# Patient Record
Sex: FEMALE | Race: WHITE | NOT HISPANIC OR LATINO | Employment: OTHER | URBAN - NONMETROPOLITAN AREA
[De-identification: names, ages, dates, MRNs, and addresses within clinical notes are randomized per-mention and may not be internally consistent; named-entity substitution may affect disease eponyms.]

---

## 2021-02-09 ENCOUNTER — CLINICAL SUPPORT (OUTPATIENT)
Dept: CARDIOLOGY | Facility: CLINIC | Age: 81
End: 2021-02-09

## 2021-02-09 DIAGNOSIS — I48.0 PAROXYSMAL ATRIAL FIBRILLATION (HCC): Primary | ICD-10-CM

## 2021-02-09 LAB — INR PPP: 2.4 (ref 0.9–1.1)

## 2021-02-09 PROCEDURE — 36416 COLLJ CAPILLARY BLOOD SPEC: CPT | Performed by: INTERNAL MEDICINE

## 2021-02-09 PROCEDURE — 85610 PROTHROMBIN TIME: CPT | Performed by: INTERNAL MEDICINE

## 2021-02-09 NOTE — PROGRESS NOTES
pts range is 2-3 INR today was 2.4 pt reports taking 4mg of warfarin everyday. No dose changes-told to repeat in 1 month.

## 2021-03-17 ENCOUNTER — OFFICE VISIT (OUTPATIENT)
Dept: CARDIOLOGY | Facility: CLINIC | Age: 81
End: 2021-03-17

## 2021-03-17 VITALS
OXYGEN SATURATION: 98 % | HEART RATE: 74 BPM | RESPIRATION RATE: 18 BRPM | SYSTOLIC BLOOD PRESSURE: 132 MMHG | DIASTOLIC BLOOD PRESSURE: 90 MMHG | TEMPERATURE: 98.7 F | HEIGHT: 60 IN | BODY MASS INDEX: 36.32 KG/M2 | WEIGHT: 185 LBS

## 2021-03-17 DIAGNOSIS — I10 ESSENTIAL HYPERTENSION: ICD-10-CM

## 2021-03-17 DIAGNOSIS — I48.0 PAF (PAROXYSMAL ATRIAL FIBRILLATION) (HCC): Primary | ICD-10-CM

## 2021-03-17 LAB — INR PPP: 2.2 (ref 0.9–1.1)

## 2021-03-17 PROCEDURE — 93000 ELECTROCARDIOGRAM COMPLETE: CPT | Performed by: INTERNAL MEDICINE

## 2021-03-17 PROCEDURE — 99214 OFFICE O/P EST MOD 30 MIN: CPT | Performed by: INTERNAL MEDICINE

## 2021-03-17 PROCEDURE — 36416 COLLJ CAPILLARY BLOOD SPEC: CPT | Performed by: INTERNAL MEDICINE

## 2021-03-17 PROCEDURE — 85610 PROTHROMBIN TIME: CPT | Performed by: INTERNAL MEDICINE

## 2021-03-17 RX ORDER — ALPRAZOLAM 0.5 MG/1
0.5 TABLET ORAL DAILY
COMMUNITY

## 2021-03-17 NOTE — PROGRESS NOTES
[facility]  [facility] CARDIOLOGY  [address] [place] [address]  Dept: [phone]  Dept Fax: [phone]    Zenaida Bond  1940    Follow Up Office Visit Note    History of Present Illness:  Zenaida Bond is a 80 y.o. female who presents to the clinic for Follow-up.PAF- She feels weak and tired, lost her  2 months ago, but also she is on AF HR 80, will increase the Sotalol to 80 mg bid, she wants to take Eliquis 5mg bid, will do it INR is 2,2     The following portions of the patient's history were reviewed and updated as appropriate: allergies, current medications, past family history, past medical history, past social history, past surgical history and problem list.    Medications:  ALPRAZolam  candesartan  NIFEdipine XL  sotalol  warfarin    Subjective  Allergies   Allergen Reactions   • Cefdinir Unknown - High Severity   • Gabapentin Unknown - High Severity   • Macrobid [Nitrofurantoin] GI Intolerance        Past Medical History:   Diagnosis Date   • BMI 34.0-34.9,adult    • Essential (primary) hypertension    • Paroxysmal atrial fibrillation (CMS/HCC)    • Uterine cancer (CMS/HCC)        History reviewed. No pertinent surgical history.    Family History   Problem Relation Age of Onset   • Breast cancer Mother    • Lung cancer Father    • Heart failure Sister    • Cirrhosis Brother         Social History     Socioeconomic History   • Marital status:      Spouse name: Not on file   • Number of children: Not on file   • Years of education: Not on file   • Highest education level: Not on file   Tobacco Use   • Smoking status: Never Smoker   • Smokeless tobacco: Never Used   Substance and Sexual Activity   • Alcohol use: Not Currently   • Drug use: Never   • Sexual activity: Defer       Review of Systems   Constitutional: Positive for activity change and fatigue.   Respiratory: Positive for shortness of breath.        Cardiovascular Procedures    ECHO/MUGA:  "  STRESS TESTS:   CARDIAC CATH:   DEVICES:   HOLTER:   CT/MRI:   VASCULAR:   CARDIOTHORACIC:     Objective  Vitals:    03/17/21 1112   BP: 132/90   BP Location: Left arm   Patient Position: Lying   Cuff Size: Large Adult   Pulse: 74   Resp: 18   Temp: 98.7 °F (37.1 °C)   TempSrc: Infrared   SpO2: 98%   Weight: 83.9 kg (185 lb)   Height: 152.4 cm (60\")   PainSc: 0-No pain     Body mass index is 36.13 kg/m².     Physical Exam  Constitutional:       Appearance: Healthy appearance. Not in distress.   Neck:      Vascular: No JVR. JVD normal.   Pulmonary:      Effort: Pulmonary effort is normal.      Breath sounds: Normal breath sounds. No wheezing. No rhonchi. No rales.   Chest:      Chest wall: Not tender to palpatation.   Cardiovascular:      PMI at left midclavicular line. Normal rate. Irregular rhythm. Normal S1. Normal S2.      Murmurs: There is no murmur.      No gallop. No click. No rub.   Pulses:     Intact distal pulses.   Edema:     Peripheral edema absent.   Abdominal:      General: Bowel sounds are normal.      Palpations: Abdomen is soft.      Tenderness: There is no abdominal tenderness.   Musculoskeletal: Normal range of motion.         General: No tenderness. Skin:     General: Skin is warm and dry.   Neurological:      General: No focal deficit present.      Mental Status: Alert and oriented to person, place and time.          Diagnostic Data    ECG 12 Lead    Date/Time: 3/17/2021 11:49 AM  Performed by: Rajan Hernandez MD  Authorized by: Rajan Hernandez MD   Comparison: compared with previous ECG   Rhythm: atrial fibrillation  Rate: normal  BPM: 80  QRS axis: left    Clinical impression: abnormal EKG            Assessment and Plan  Diagnoses and all orders for this visit:    PAF (paroxysmal atrial fibrillation) (CMS/HCC)- She feels tired and fatigue, HR is 80, will increase Sotalol to 80 mg bid if not conversion, will do cardioversion, she wants to change to Eliquis 5 mg bid creatinine is " normal    Essential hypertension- The BP is 120.80 on Candesartan 32 mg and Nifedipine xl 60 mg     Other orders  -     ALPRAZolam (XANAX) 0.5 MG tablet; Take 0.5 mg by mouth Daily. Take one tablet by mouth at bedtime PRN         No follow-ups on file.    Rajan Hernandez MD  03/17/2021

## 2021-03-25 ENCOUNTER — TELEPHONE (OUTPATIENT)
Dept: CARDIOLOGY | Facility: CLINIC | Age: 81
End: 2021-03-25

## 2021-03-26 ENCOUNTER — TELEPHONE (OUTPATIENT)
Dept: CARDIOLOGY | Facility: CLINIC | Age: 81
End: 2021-03-26

## 2021-03-26 NOTE — TELEPHONE ENCOUNTER
Pt came in today for a  B/p check and pt brought list with last week number it is in your black folder. Please advise. 130/70

## 2021-03-29 ENCOUNTER — OFFICE VISIT (OUTPATIENT)
Dept: CARDIOLOGY | Facility: CLINIC | Age: 81
End: 2021-03-29

## 2021-03-29 VITALS
DIASTOLIC BLOOD PRESSURE: 82 MMHG | WEIGHT: 175 LBS | SYSTOLIC BLOOD PRESSURE: 136 MMHG | BODY MASS INDEX: 34.36 KG/M2 | OXYGEN SATURATION: 98 % | RESPIRATION RATE: 12 BRPM | HEIGHT: 60 IN | HEART RATE: 57 BPM | TEMPERATURE: 97.1 F

## 2021-03-29 DIAGNOSIS — I48.0 PAF (PAROXYSMAL ATRIAL FIBRILLATION) (HCC): Primary | ICD-10-CM

## 2021-03-29 DIAGNOSIS — I10 ESSENTIAL HYPERTENSION: ICD-10-CM

## 2021-03-29 PROCEDURE — 99214 OFFICE O/P EST MOD 30 MIN: CPT | Performed by: INTERNAL MEDICINE

## 2021-03-29 PROCEDURE — 93000 ELECTROCARDIOGRAM COMPLETE: CPT | Performed by: INTERNAL MEDICINE

## 2021-03-29 RX ORDER — SOTALOL HYDROCHLORIDE 80 MG/1
TABLET ORAL
Qty: 60 TABLET | Refills: 6 | Status: SHIPPED | OUTPATIENT
Start: 2021-03-29 | End: 2021-08-18 | Stop reason: SDUPTHER

## 2021-03-29 NOTE — PROGRESS NOTES
[facility]  [facility] CARDIOLOGY  [address] [place] [address]  Dept: [phone]  Dept Fax: [phone]    Zenaida Bond  1940    Follow Up Office Visit Note    History of Present Illness:  Zenaida Bond is a 80 y.o. female who presents to the clinic for PAF-She was feeling fatigue and tired and her HR was slower, she lower the sotalol to 40 mg bid, she is back to sinus, HR is 60, will use Sotalol 80 AM and 40 PM, will keep Eliquis,    The following portions of the patient's history were reviewed and updated as appropriate: allergies, current medications, past family history, past medical history, past social history, past surgical history and problem list.    Medications:  ALPRAZolam  apixaban tablet  candesartan  NIFEdipine XL  sotalol    Subjective  Allergies   Allergen Reactions   • Cefdinir Unknown - High Severity   • Gabapentin Unknown - High Severity   • Macrobid [Nitrofurantoin] GI Intolerance        Past Medical History:   Diagnosis Date   • BMI 34.0-34.9,adult    • Essential (primary) hypertension    • Paroxysmal atrial fibrillation (CMS/HCC)    • Uterine cancer (CMS/HCC)        History reviewed. No pertinent surgical history.    Family History   Problem Relation Age of Onset   • Breast cancer Mother    • Lung cancer Father    • Heart failure Sister    • Cirrhosis Brother         Social History     Socioeconomic History   • Marital status:      Spouse name: Not on file   • Number of children: Not on file   • Years of education: Not on file   • Highest education level: Not on file   Tobacco Use   • Smoking status: Never Smoker   • Smokeless tobacco: Never Used   Substance and Sexual Activity   • Alcohol use: Not Currently   • Drug use: Never   • Sexual activity: Defer       Review of Systems   Constitutional: Negative.    HENT: Negative.    Respiratory: Negative.    Cardiovascular: Negative.    Endocrine: Negative.    Genitourinary: Negative.   "  Musculoskeletal: Negative.    Skin: Negative.    Allergic/Immunologic: Negative.    Neurological: Negative.    Hematological: Negative.    Psychiatric/Behavioral: Negative.    All other systems reviewed and are negative.      Cardiovascular Procedures    ECHO/MUGA:   STRESS TESTS:   CARDIAC CATH:   DEVICES:   HOLTER:   CT/MRI:   VASCULAR:   CARDIOTHORACIC:     Objective  Vitals:    03/29/21 1107   BP: 136/82   BP Location: Left arm   Patient Position: Sitting   Cuff Size: Adult   Pulse: 57   Resp: 12   Temp: 97.1 °F (36.2 °C)   TempSrc: Infrared   SpO2: 98%   Weight: 79.4 kg (175 lb)   Height: 152.4 cm (60\")   PainSc: 0-No pain     Body mass index is 34.18 kg/m².     Physical Exam  Cardiovascular:      PMI at left midclavicular line. Normal rate. Regular rhythm. Normal S1. Normal S2.      Murmurs: There is no murmur.      No gallop. No click. No rub.   Pulses:     Intact distal pulses.   Edema:     Peripheral edema absent.          Diagnostic Data    ECG 12 Lead    Date/Time: 3/29/2021 11:53 AM  Performed by: Rajan Hernandez MD  Authorized by: Rajan Hernandez MD   Comparison: compared with previous ECG   Similar to previous ECG  Rhythm: sinus rhythm  Rate: normal  BPM: 60  QRS axis: normal    Clinical impression: normal ECG            Assessment and Plan  Diagnoses and all orders for this visit:    PAF (paroxysmal atrial fibrillation) (CMS/[facility])- She seems doing better, went back to sinus, will increase Sotalol to 80 and 40 Pm     Essential hypertension- BP is fine 120.80 Dr Abbasi has increase Nifedipine to 60 mg bid, keep also candesartan 32 mg    Other orders  -     sotalol (BETAPACE) 80 MG tablet; Take one pill in AM  And half of the pill PM         Return in about 2 months (around 5/29/2021) for Recheck.    Rajan Hernandez MD  03/29/2021  "

## 2021-04-12 ENCOUNTER — TELEPHONE (OUTPATIENT)
Dept: CARDIOLOGY | Facility: CLINIC | Age: 81
End: 2021-04-12

## 2021-04-12 ENCOUNTER — OUTSIDE FACILITY SERVICE (OUTPATIENT)
Dept: CARDIOLOGY | Facility: CLINIC | Age: 81
End: 2021-04-12

## 2021-04-12 PROCEDURE — 93306 TTE W/DOPPLER COMPLETE: CPT | Performed by: INTERNAL MEDICINE

## 2021-04-12 NOTE — TELEPHONE ENCOUNTER
ALLEY CALLING STATING SINCE SHE STARTED ON ELIQUIS SHE HAS A LOW HEART BEAT AND NO ENERGY AND HER HEAD FEELS NUMB     PLEASE CALL HER    [phone]

## 2021-04-13 ENCOUNTER — OUTSIDE FACILITY SERVICE (OUTPATIENT)
Dept: CARDIOLOGY | Facility: CLINIC | Age: 81
End: 2021-04-13

## 2021-04-13 PROCEDURE — 99232 SBSQ HOSP IP/OBS MODERATE 35: CPT | Performed by: INTERNAL MEDICINE

## 2021-04-14 ENCOUNTER — TELEPHONE (OUTPATIENT)
Dept: CARDIOLOGY | Facility: CLINIC | Age: 81
End: 2021-04-14

## 2021-04-14 NOTE — TELEPHONE ENCOUNTER
Keep an eye  
Please advise the message below..    patient just got out of hosp she is taking the hydrochloride and then she took her BP it was     101/47     Heart rate 52     Please advise  She feels weak        [phone]  
Pt is going to keep track of her b/p and call us on Friday if not better.  
patient just got out of hosp she is taking the hydrochloride and then she took her BP it was    101/47    Heart rate 52    Please advise  She feels weak      [phone]  
36.7

## 2021-04-19 ENCOUNTER — OFFICE VISIT (OUTPATIENT)
Dept: CARDIOLOGY | Facility: CLINIC | Age: 81
End: 2021-04-19

## 2021-04-19 VITALS
BODY MASS INDEX: 33.89 KG/M2 | OXYGEN SATURATION: 99 % | DIASTOLIC BLOOD PRESSURE: 72 MMHG | HEIGHT: 60 IN | SYSTOLIC BLOOD PRESSURE: 130 MMHG | WEIGHT: 172.6 LBS | RESPIRATION RATE: 12 BRPM | TEMPERATURE: 97.8 F | HEART RATE: 88 BPM

## 2021-04-19 DIAGNOSIS — E78.5 HYPERLIPIDEMIA LDL GOAL <100: ICD-10-CM

## 2021-04-19 DIAGNOSIS — I10 ESSENTIAL HYPERTENSION: ICD-10-CM

## 2021-04-19 DIAGNOSIS — I48.0 PAF (PAROXYSMAL ATRIAL FIBRILLATION) (HCC): Primary | ICD-10-CM

## 2021-04-19 PROCEDURE — 99214 OFFICE O/P EST MOD 30 MIN: CPT | Performed by: INTERNAL MEDICINE

## 2021-04-19 RX ORDER — HYDRALAZINE HYDROCHLORIDE 50 MG/1
25 TABLET, FILM COATED ORAL 2 TIMES DAILY
Qty: 180 TABLET | Refills: 3 | Status: SHIPPED | OUTPATIENT
Start: 2021-04-19 | End: 2021-08-18

## 2021-04-19 RX ORDER — HYDRALAZINE HYDROCHLORIDE 50 MG/1
TABLET, FILM COATED ORAL
COMMUNITY
Start: 2021-04-13 | End: 2021-04-19 | Stop reason: SDUPTHER

## 2021-04-19 RX ORDER — ATORVASTATIN CALCIUM 40 MG/1
TABLET, FILM COATED ORAL
COMMUNITY
Start: 2021-04-13

## 2021-04-19 NOTE — PROGRESS NOTES
[facility]  [facility] CARDIOLOGY  [address] [place] [address]  Dept: [phone]  Dept Fax: [phone]    Zenaida Bond  1940    Follow Up Office Visit Note    History of Present Illness:  Zenaida Bond is a 80 y.o. female who presents to the clinic for Follow-up. PAF- She was recently at the hospital with panic attacks, she has been in sinus on Sotalol 80.40, he just mention that she is not taking the night dose, advised to do it, her HR is 80, also on Eliquis 5mg bid    The following portions of the patient's history were reviewed and updated as appropriate: allergies, current medications, past family history, past medical history, past social history, past surgical history and problem list.    Medications:  ALPRAZolam  apixaban tablet  atorvastatin  candesartan  hydrALAZINE  NIFEdipine XL  sotalol    Subjective  Allergies   Allergen Reactions   • Cefdinir Unknown - High Severity   • Gabapentin Unknown - High Severity   • Macrobid [Nitrofurantoin] GI Intolerance        Past Medical History:   Diagnosis Date   • BMI 34.0-34.9,adult    • Essential (primary) hypertension    • Paroxysmal atrial fibrillation (CMS/HCC)    • Uterine cancer (CMS/HCC)        History reviewed. No pertinent surgical history.    Family History   Problem Relation Age of Onset   • Breast cancer Mother    • Lung cancer Father    • Heart failure Sister    • Cirrhosis Brother         Social History     Socioeconomic History   • Marital status:      Spouse name: Not on file   • Number of children: Not on file   • Years of education: Not on file   • Highest education level: Not on file   Tobacco Use   • Smoking status: Never Smoker   • Smokeless tobacco: Never Used   Substance and Sexual Activity   • Alcohol use: Not Currently   • Drug use: Never   • Sexual activity: Defer       Review of Systems   All other systems reviewed and are negative.      Cardiovascular Procedures    ECHO/MUGA:   STRESS  "TESTS:   CARDIAC CATH:   DEVICES:   HOLTER:   CT/MRI:   VASCULAR:   CARDIOTHORACIC:     Objective  Vitals:    04/19/21 1056   BP: 130/72   BP Location: Left arm   Patient Position: Sitting   Cuff Size: Adult   Pulse: 88   Resp: 12   Temp: 97.8 °F (36.6 °C)   TempSrc: Infrared   SpO2: 99%   Weight: 78.3 kg (172 lb 9.6 oz)   Height: 152.4 cm (60\")   PainSc: 0-No pain     Body mass index is 33.71 kg/m².     Physical Exam  Constitutional:       Appearance: Healthy appearance. Not in distress.   Neck:      Vascular: No JVR. JVD normal.   Pulmonary:      Effort: Pulmonary effort is normal.      Breath sounds: Normal breath sounds. No wheezing. No rhonchi. No rales.   Chest:      Chest wall: Not tender to palpatation.   Cardiovascular:      PMI at left midclavicular line. Normal rate. Regular rhythm. Normal S1. Normal S2.      Murmurs: There is no murmur.      No gallop. No click. No rub.   Pulses:     Intact distal pulses.   Edema:     Peripheral edema absent.   Abdominal:      General: Bowel sounds are normal.      Palpations: Abdomen is soft.      Tenderness: There is no abdominal tenderness.   Musculoskeletal: Normal range of motion.         General: No tenderness. Skin:     General: Skin is warm and dry.   Neurological:      General: No focal deficit present.      Mental Status: Alert and oriented to person, place and time.          Diagnostic Data  Procedures    Assessment and Plan  Diagnoses and all orders for this visit:    PAF (paroxysmal atrial fibrillation) (CMS/[facility])- She is in sinus on Sotalol, advised to take twice a day, keep eliquis, no complaints    Essential hypertension- BP is better, on Hydralazine 25 mg bid, nifedipine 60 mg and Candesartan 32mg    Hyperlipidemia LDL goal <100- on Lipitor     Other orders  -     atorvastatin (LIPITOR) 40 MG tablet  -     Discontinue: hydrALAZINE (APRESOLINE) 50 MG tablet  -     hydrALAZINE (APRESOLINE) 50 MG tablet; Take 0.5 tablets by mouth 2 (Two) Times a Day.     "     Return in about 4 months (around 8/19/2021).    Rajan Hernandez MD  04/19/2021

## 2021-08-18 ENCOUNTER — OFFICE VISIT (OUTPATIENT)
Dept: CARDIOLOGY | Facility: CLINIC | Age: 81
End: 2021-08-18

## 2021-08-18 VITALS
RESPIRATION RATE: 12 BRPM | SYSTOLIC BLOOD PRESSURE: 152 MMHG | HEIGHT: 60 IN | TEMPERATURE: 96 F | OXYGEN SATURATION: 99 % | DIASTOLIC BLOOD PRESSURE: 82 MMHG | WEIGHT: 187 LBS | BODY MASS INDEX: 36.71 KG/M2 | HEART RATE: 86 BPM

## 2021-08-18 DIAGNOSIS — I10 ESSENTIAL HYPERTENSION: ICD-10-CM

## 2021-08-18 DIAGNOSIS — E78.5 HYPERLIPIDEMIA LDL GOAL <100: ICD-10-CM

## 2021-08-18 DIAGNOSIS — I48.0 PAF (PAROXYSMAL ATRIAL FIBRILLATION) (HCC): Primary | ICD-10-CM

## 2021-08-18 PROCEDURE — 93000 ELECTROCARDIOGRAM COMPLETE: CPT | Performed by: INTERNAL MEDICINE

## 2021-08-18 PROCEDURE — 99214 OFFICE O/P EST MOD 30 MIN: CPT | Performed by: INTERNAL MEDICINE

## 2021-08-18 RX ORDER — SOTALOL HYDROCHLORIDE 80 MG/1
TABLET ORAL
Qty: 60 TABLET | Refills: 6 | Status: SHIPPED | OUTPATIENT
Start: 2021-08-18 | End: 2021-10-01 | Stop reason: SDUPTHER

## 2021-08-18 NOTE — PROGRESS NOTES
[facility]  [facility] CARDIOLOGY  [address] [place] [address]  Dept: [phone]  Dept Fax: [phone]    Zenaida Bond  1940    Follow Up Office Visit Note    History of Present Illness:  Zenaida Bond is a 81 y.o. female who presents to the clinic for Follow-up. Paroxysmal atrial Fibrillation- She is asymptomatic but back to AF/,Hr is 104, she has taking the sotalol 80 daily instead of twice a day/.She thinks her HR was too low? Will increase back to 80 mg bid, if does not come back to sinus, will do cardioversion, or rate control    The following portions of the patient's history were reviewed and updated as appropriate: allergies, current medications, past family history, past medical history, past social history, past surgical history and problem list.    Medications:  ALPRAZolam  apixaban tablet  atorvastatin  candesartan  hydrALAZINE  NIFEdipine XL  sotalol    Subjective  Allergies   Allergen Reactions   • Cefdinir Unknown - High Severity   • Gabapentin Unknown - High Severity   • Macrobid [Nitrofurantoin] GI Intolerance        Past Medical History:   Diagnosis Date   • BMI 34.0-34.9,adult    • Essential (primary) hypertension    • Paroxysmal atrial fibrillation (CMS/HCC)    • Uterine cancer (CMS/HCC)        History reviewed. No pertinent surgical history.    Family History   Problem Relation Age of Onset   • Breast cancer Mother    • Lung cancer Father    • Heart failure Sister    • Cirrhosis Brother         Social History     Socioeconomic History   • Marital status:      Spouse name: Not on file   • Number of children: Not on file   • Years of education: Not on file   • Highest education level: Not on file   Tobacco Use   • Smoking status: Never Smoker   • Smokeless tobacco: Never Used   Vaping Use   • Vaping Use: Never used   Substance and Sexual Activity   • Alcohol use: Not Currently   • Drug use: Never   • Sexual activity: Defer       Review of  "Systems   Constitutional: Negative.    HENT: Negative.    Respiratory: Negative.    Cardiovascular: Negative.    Endocrine: Negative.    Genitourinary: Negative.    Musculoskeletal: Negative.    Skin: Negative.    Allergic/Immunologic: Negative.    Neurological: Negative.    Hematological: Negative.    Psychiatric/Behavioral: Negative.    All other systems reviewed and are negative.      Cardiovascular Procedures    ECHO/MUGA:   STRESS TESTS:   CARDIAC CATH:   DEVICES:   HOLTER:   CT/MRI:   VASCULAR:   CARDIOTHORACIC:     Objective  Vitals:    08/18/21 1321   BP: 152/82   BP Location: Left arm   Patient Position: Lying   Cuff Size: Adult   Pulse: 86   Resp: 12   Temp: 96 °F (35.6 °C)   TempSrc: Infrared   SpO2: 99%   Weight: 84.8 kg (187 lb)   Height: 152.4 cm (60\")   PainSc: 0-No pain     Body mass index is 36.52 kg/m².     Physical Exam  Constitutional:       Appearance: Healthy appearance. Not in distress.   Neck:      Vascular: No JVR. JVD normal.   Pulmonary:      Effort: Pulmonary effort is normal.      Breath sounds: Normal breath sounds. No wheezing. No rhonchi. No rales.   Chest:      Chest wall: Not tender to palpatation.   Cardiovascular:      Irregularly irregular rhythm.   Abdominal:      General: Bowel sounds are normal.      Palpations: Abdomen is soft.      Tenderness: There is no abdominal tenderness.   Musculoskeletal: Normal range of motion.         General: No tenderness. Skin:     General: Skin is warm and dry.   Neurological:      General: No focal deficit present.      Mental Status: Alert and oriented to person, place and time.          Diagnostic Data    ECG 12 Lead    Date/Time: 8/18/2021 1:56 PM  Performed by: Rajan Hernandez MD  Authorized by: Rajan Hernandez MD   Comparison: not compared with previous ECG   Rhythm: atrial fibrillation  Rate: tachycardic  BPM: 104  QRS axis: left    Clinical impression: abnormal EKG            Assessment and Plan  Diagnoses and all orders " for this visit:    PAF (paroxysmal atrial fibrillation) (CMS/HCC)- She is again in AF due to non compliance , will restart 80 mg bid, and Eliquis 5mg bid    Essential hypertension-The BP is 150.80 she stop taking the Sotalol, she states at home Bp is low ???? On Candesartan 32mg, Nifedipine ER  60 mg    Hyperlipidemia LDL goal <100- On Lipitor 40 mg         No follow-ups on file.    Rajan Hernandez MD  08/18/2021

## 2021-09-15 ENCOUNTER — OFFICE VISIT (OUTPATIENT)
Dept: CARDIOLOGY | Facility: CLINIC | Age: 81
End: 2021-09-15

## 2021-09-15 VITALS
SYSTOLIC BLOOD PRESSURE: 110 MMHG | HEIGHT: 60 IN | DIASTOLIC BLOOD PRESSURE: 68 MMHG | WEIGHT: 181 LBS | HEART RATE: 49 BPM | TEMPERATURE: 97.1 F | OXYGEN SATURATION: 96 % | RESPIRATION RATE: 15 BRPM | BODY MASS INDEX: 35.53 KG/M2

## 2021-09-15 DIAGNOSIS — I48.0 PAF (PAROXYSMAL ATRIAL FIBRILLATION) (HCC): Primary | ICD-10-CM

## 2021-09-15 DIAGNOSIS — I10 ESSENTIAL HYPERTENSION: ICD-10-CM

## 2021-09-15 DIAGNOSIS — E78.5 HYPERLIPIDEMIA LDL GOAL <100: ICD-10-CM

## 2021-09-15 PROCEDURE — 99214 OFFICE O/P EST MOD 30 MIN: CPT | Performed by: INTERNAL MEDICINE

## 2021-09-15 PROCEDURE — 93000 ELECTROCARDIOGRAM COMPLETE: CPT | Performed by: INTERNAL MEDICINE

## 2021-09-15 NOTE — PROGRESS NOTES
[facility] [place]  [facility] CARDIOLOGY  [address] [place] [address]  Dept: [phone]  Dept Fax: [phone]    Zenaida Bond  1940    Follow Up Office Visit Note    History of Present Illness:  Zenaida Bond is a 81 y.o. female who presents to the clinic for Follow-up. She is asympotomatic but still on AF on Sotalol 80 mg bid, and also Eliquis 5 mg bid, she has not decided to go for cardioversion, she will discuss with her children.    The following portions of the patient's history were reviewed and updated as appropriate: allergies, current medications, past family history, past medical history, past social history, past surgical history and problem list.    Medications:  ALPRAZolam  apixaban tablet  atorvastatin  candesartan  NIFEdipine XL  sotalol    Subjective  Allergies   Allergen Reactions   • Cefdinir Unknown - High Severity   • Gabapentin Unknown - High Severity   • Macrobid [Nitrofurantoin] GI Intolerance        Past Medical History:   Diagnosis Date   • BMI 34.0-34.9,adult    • Essential (primary) hypertension    • Paroxysmal atrial fibrillation (CMS/HCC)    • Uterine cancer (CMS/HCC)        History reviewed. No pertinent surgical history.    Family History   Problem Relation Age of Onset   • Breast cancer Mother    • Lung cancer Father    • Heart failure Sister    • Cirrhosis Brother         Social History     Socioeconomic History   • Marital status:      Spouse name: Not on file   • Number of children: Not on file   • Years of education: Not on file   • Highest education level: Not on file   Tobacco Use   • Smoking status: Never Smoker   • Smokeless tobacco: Never Used   Vaping Use   • Vaping Use: Never used   Substance and Sexual Activity   • Alcohol use: Not Currently   • Drug use: Never   • Sexual activity: Defer       Review of Systems   Constitutional: Negative.    HENT: Negative.    Respiratory: Negative.    Cardiovascular: Negative.   "  Endocrine: Negative.    Genitourinary: Negative.    Musculoskeletal: Negative.    Skin: Negative.    Allergic/Immunologic: Negative.    Neurological: Negative.    Hematological: Negative.    Psychiatric/Behavioral: Negative.    All other systems reviewed and are negative.      Cardiovascular Procedures    ECHO/MUGA:   STRESS TESTS:   CARDIAC CATH:   DEVICES:   HOLTER:   CT/MRI:   VASCULAR:   CARDIOTHORACIC:     Objective  Vitals:    09/15/21 1419   BP: 110/68   BP Location: Left arm   Patient Position: Sitting   Cuff Size: Large Adult   Pulse: (!) 49   Resp: 15   Temp: 97.1 °F (36.2 °C)   TempSrc: Infrared   SpO2: 96%   Weight: 82.1 kg (181 lb)   Height: 152.4 cm (60\")   PainSc: 0-No pain     Body mass index is 35.35 kg/m².     Physical Exam  Constitutional:       Appearance: Healthy appearance. Not in distress.   Neck:      Vascular: No JVR. JVD normal.   Pulmonary:      Effort: Pulmonary effort is normal.      Breath sounds: Normal breath sounds. No wheezing. No rhonchi. No rales.   Chest:      Chest wall: Not tender to palpatation.   Cardiovascular:      PMI at left midclavicular line. Normal rate. Regular rhythm. Normal S1. Normal S2.      Murmurs: There is no murmur.      No gallop. No click. No rub.   Pulses:     Intact distal pulses.   Edema:     Peripheral edema absent.   Abdominal:      General: Bowel sounds are normal.      Palpations: Abdomen is soft.      Tenderness: There is no abdominal tenderness.   Musculoskeletal: Normal range of motion.         General: No tenderness. Skin:     General: Skin is warm and dry.   Neurological:      General: No focal deficit present.      Mental Status: Alert and oriented to person, place and time.          Diagnostic Data    ECG 12 Lead    Date/Time: 9/15/2021 2:45 PM  Performed by: Rajan Hernandez MD  Authorized by: Rajan Hernandez MD   Comparison: compared with previous ECG   Similar to previous ECG  Rhythm: atrial fibrillation  Rate: normal  BPM: " 66  QRS axis: normal    Clinical impression: abnormal EKG            Assessment and Plan  Diagnoses and all orders for this visit:    PAF (paroxysmal atrial fibrillation) (CMS/[facility])- In AF asymptomatic, cardioversion vs rate control, she will talk to her children    Essential hypertension- BP is 110.60, on Candesartan 32 mgm, Nifedipine xl 60 mg    Hyperlipidemia LDL goal <100 on Lipitor 40 mg         Return in about 3 months (around 12/15/2021) for Recheck.    Rajan Hernandez MD  09/15/2021

## 2021-09-21 ENCOUNTER — TELEPHONE (OUTPATIENT)
Dept: CARDIOLOGY | Facility: CLINIC | Age: 81
End: 2021-09-21

## 2021-09-21 DIAGNOSIS — I10 ESSENTIAL HYPERTENSION: Primary | ICD-10-CM

## 2021-09-21 RX ORDER — CANDESARTAN 32 MG/1
32 TABLET ORAL DAILY
Qty: 90 TABLET | Refills: 3 | Status: SHIPPED | OUTPATIENT
Start: 2021-09-21

## 2021-09-21 NOTE — TELEPHONE ENCOUNTER
candesartan (ATACAND) 32 MG         Patient called requesting a refill of the above medication.  She stated that she only had 2 pills left and that she would like to pick this up when she goes to town tomorrow.  [facility] pharmacy in [place]

## 2021-10-01 ENCOUNTER — OFFICE VISIT (OUTPATIENT)
Dept: CARDIOLOGY | Facility: CLINIC | Age: 81
End: 2021-10-01

## 2021-10-01 VITALS
HEIGHT: 60 IN | TEMPERATURE: 99 F | BODY MASS INDEX: 36.52 KG/M2 | OXYGEN SATURATION: 99 % | HEART RATE: 86 BPM | WEIGHT: 186 LBS | DIASTOLIC BLOOD PRESSURE: 74 MMHG | RESPIRATION RATE: 12 BRPM | SYSTOLIC BLOOD PRESSURE: 146 MMHG

## 2021-10-01 DIAGNOSIS — E78.5 HYPERLIPIDEMIA LDL GOAL <100: ICD-10-CM

## 2021-10-01 DIAGNOSIS — I10 ESSENTIAL HYPERTENSION: ICD-10-CM

## 2021-10-01 DIAGNOSIS — I48.0 PAF (PAROXYSMAL ATRIAL FIBRILLATION) (HCC): Primary | ICD-10-CM

## 2021-10-01 PROCEDURE — 99214 OFFICE O/P EST MOD 30 MIN: CPT | Performed by: INTERNAL MEDICINE

## 2021-10-01 RX ORDER — SOTALOL HYDROCHLORIDE 80 MG/1
TABLET ORAL
Qty: 60 TABLET | Refills: 6 | Status: SHIPPED | OUTPATIENT
Start: 2021-10-01 | End: 2021-10-12

## 2021-10-01 NOTE — PROGRESS NOTES
[facility] [place]  [facility] CARDIOLOGY  [address] [place] [address]  Dept: [phone]  Dept Fax: [phone]    Zenaida Bond  1940    Follow Up Office Visit Note    History of Present Illness:  Zenaida Bond is a 81 y.o. female who presents to the clinic for Follow-up. Paroxysmal atrial fibrillation- She feels fatigue and tired, weak, SOB, she has not decided yet , will schedule for cardioversion she is taking 80.40 sotalolHr 72, on Eliquis 5mg bid,     The following portions of the patient's history were reviewed and updated as appropriate: allergies, current medications, past family history, past medical history, past social history, past surgical history and problem list.    Medications:  ALPRAZolam  apixaban tablet  atorvastatin  candesartan  NIFEdipine XL  sotalol    Subjective  Allergies   Allergen Reactions   • Cefdinir Unknown - High Severity   • Gabapentin Unknown - High Severity   • Macrobid [Nitrofurantoin] GI Intolerance        Past Medical History:   Diagnosis Date   • BMI 34.0-34.9,adult    • Essential (primary) hypertension    • Paroxysmal atrial fibrillation (HCC)    • Uterine cancer (HCC)        History reviewed. No pertinent surgical history.    Family History   Problem Relation Age of Onset   • Breast cancer Mother    • Lung cancer Father    • Heart failure Sister    • Cirrhosis Brother         Social History     Socioeconomic History   • Marital status:      Spouse name: Not on file   • Number of children: Not on file   • Years of education: Not on file   • Highest education level: Not on file   Tobacco Use   • Smoking status: Never Smoker   • Smokeless tobacco: Never Used   Vaping Use   • Vaping Use: Never used   Substance and Sexual Activity   • Alcohol use: Not Currently   • Drug use: Never   • Sexual activity: Defer       Review of Systems   Constitutional: Positive for fatigue.   HENT: Negative.    Respiratory: Positive for shortness of  "breath.    Cardiovascular: Negative.    Endocrine: Negative.    Genitourinary: Negative.    Musculoskeletal: Negative.    Skin: Negative.    Allergic/Immunologic: Negative.    Neurological: Negative.    Hematological: Negative.    Psychiatric/Behavioral: Negative.    All other systems reviewed and are negative.      Cardiovascular Procedures    ECHO/MUGA:   STRESS TESTS:   CARDIAC CATH:   DEVICES:   HOLTER:   CT/MRI:   VASCULAR:   CARDIOTHORACIC:     Objective  Vitals:    10/01/21 0904   BP: 146/74   BP Location: Left arm   Patient Position: Sitting   Cuff Size: Adult   Pulse: 86   Resp: 12   Temp: 99 °F (37.2 °C)   TempSrc: Infrared   SpO2: 99%   Weight: 84.4 kg (186 lb)   Height: 152.4 cm (60\")   PainSc: 0-No pain     Body mass index is 36.33 kg/m².     Physical Exam  Constitutional:       Appearance: Healthy appearance. Not in distress.   Neck:      Vascular: No JVR. JVD normal.   Pulmonary:      Effort: Pulmonary effort is normal.      Breath sounds: Normal breath sounds. No wheezing. No rhonchi. No rales.   Chest:      Chest wall: Not tender to palpatation.   Cardiovascular:      PMI at left midclavicular line. Normal rate. Irregularly irregular rhythm. Normal S1. Normal S2.      Murmurs: There is no murmur.      No gallop. No click. No rub.   Pulses:     Intact distal pulses.   Edema:     Peripheral edema absent.   Abdominal:      General: Bowel sounds are normal.      Palpations: Abdomen is soft.      Tenderness: There is no abdominal tenderness.   Musculoskeletal: Normal range of motion.         General: No tenderness. Skin:     General: Skin is warm and dry.   Neurological:      General: No focal deficit present.      Mental Status: Alert and oriented to person, place and time.          Diagnostic Data  Procedures    Assessment and Plan  Diagnoses and all orders for this visit:    PAF (paroxysmal atrial fibrillation) (HCC)- On Sotalol 80.40, and Eliquis 5 mg bid  -     sotalol (BETAPACE) 80 MG tablet; Take " one pill in AM  And half of the pill PM  -     Cardioversion External in Cardiology Department; Future  -     CBC & Differential  -     Comprehensive Metabolic Panel    Essential hypertension- The BP is 130.80,on Nifedipine xl 60 mg,and Candesartan 32 mg.,    Hyperlipidemia LDL goal <100 on lipitor 40 mg  -     High Sensitivity CRP  -     Lipid Panel         Return in about 3 weeks (around 10/22/2021) for Recheck.    Rajan Hernandez MD  10/01/2021

## 2021-10-02 LAB
ALBUMIN SERPL-MCNC: 4.2 G/DL (ref 3.7–4.7)
ALBUMIN/GLOB SERPL: 1.6 {RATIO} (ref 1.2–2.2)
ALP SERPL-CCNC: 86 IU/L (ref 44–121)
ALT SERPL-CCNC: 14 IU/L (ref 0–32)
AST SERPL-CCNC: 21 IU/L (ref 0–40)
BASOPHILS # BLD AUTO: 0 X10E3/UL (ref 0–0.2)
BASOPHILS NFR BLD AUTO: 0 %
BILIRUB SERPL-MCNC: 0.5 MG/DL (ref 0–1.2)
BUN SERPL-MCNC: 15 MG/DL (ref 8–27)
BUN/CREAT SERPL: 18 (ref 12–28)
CALCIUM SERPL-MCNC: 8.9 MG/DL (ref 8.7–10.3)
CHLORIDE SERPL-SCNC: 106 MMOL/L (ref 96–106)
CHOLEST SERPL-MCNC: 170 MG/DL (ref 100–199)
CO2 SERPL-SCNC: 22 MMOL/L (ref 20–29)
CREAT SERPL-MCNC: 0.82 MG/DL (ref 0.57–1)
CRP SERPL HS-MCNC: 0.88 MG/L (ref 0–3)
EOSINOPHIL # BLD AUTO: 0.1 X10E3/UL (ref 0–0.4)
EOSINOPHIL NFR BLD AUTO: 1 %
ERYTHROCYTE [DISTWIDTH] IN BLOOD BY AUTOMATED COUNT: 11.6 % (ref 11.7–15.4)
GLOBULIN SER CALC-MCNC: 2.6 G/DL (ref 1.5–4.5)
GLUCOSE SERPL-MCNC: 121 MG/DL (ref 65–99)
HCT VFR BLD AUTO: 37.9 % (ref 34–46.6)
HDLC SERPL-MCNC: 50 MG/DL
HGB BLD-MCNC: 12.3 G/DL (ref 11.1–15.9)
IMM GRANULOCYTES # BLD AUTO: 0 X10E3/UL (ref 0–0.1)
IMM GRANULOCYTES NFR BLD AUTO: 0 %
LDLC SERPL CALC-MCNC: 101 MG/DL (ref 0–99)
LYMPHOCYTES # BLD AUTO: 0.9 X10E3/UL (ref 0.7–3.1)
LYMPHOCYTES NFR BLD AUTO: 19 %
MCH RBC QN AUTO: 32.5 PG (ref 26.6–33)
MCHC RBC AUTO-ENTMCNC: 32.5 G/DL (ref 31.5–35.7)
MCV RBC AUTO: 100 FL (ref 79–97)
MONOCYTES # BLD AUTO: 0.4 X10E3/UL (ref 0.1–0.9)
MONOCYTES NFR BLD AUTO: 8 %
NEUTROPHILS # BLD AUTO: 3.6 X10E3/UL (ref 1.4–7)
NEUTROPHILS NFR BLD AUTO: 72 %
PLATELET # BLD AUTO: 183 X10E3/UL (ref 150–450)
POTASSIUM SERPL-SCNC: 4.7 MMOL/L (ref 3.5–5.2)
PROT SERPL-MCNC: 6.8 G/DL (ref 6–8.5)
RBC # BLD AUTO: 3.78 X10E6/UL (ref 3.77–5.28)
SODIUM SERPL-SCNC: 144 MMOL/L (ref 134–144)
TRIGL SERPL-MCNC: 102 MG/DL (ref 0–149)
VLDLC SERPL CALC-MCNC: 19 MG/DL (ref 5–40)
WBC # BLD AUTO: 5 X10E3/UL (ref 3.4–10.8)

## 2021-10-04 ENCOUNTER — TELEPHONE (OUTPATIENT)
Dept: CARDIOLOGY | Facility: CLINIC | Age: 81
End: 2021-10-04

## 2021-10-04 NOTE — TELEPHONE ENCOUNTER
Spoke with Dr Hernandez and yes they will .  Left message for her and son phone . Spoke with pt at [phone]

## 2021-10-04 NOTE — TELEPHONE ENCOUNTER
SHE WOKE UP WITH A UTI AND IS ON A ANTIBIOTIC WIILL THEY STILL DO THE CARDIO VERSION   PLEASE CALL HER BACK

## 2021-10-05 ENCOUNTER — OUTSIDE FACILITY SERVICE (OUTPATIENT)
Dept: CARDIOLOGY | Facility: CLINIC | Age: 81
End: 2021-10-05

## 2021-10-05 PROCEDURE — 92960 CARDIOVERSION ELECTRIC EXT: CPT | Performed by: INTERNAL MEDICINE

## 2021-10-12 ENCOUNTER — OFFICE VISIT (OUTPATIENT)
Dept: CARDIOLOGY | Facility: CLINIC | Age: 81
End: 2021-10-12

## 2021-10-12 VITALS
HEIGHT: 60 IN | TEMPERATURE: 97.1 F | RESPIRATION RATE: 15 BRPM | WEIGHT: 184 LBS | HEART RATE: 50 BPM | BODY MASS INDEX: 36.12 KG/M2 | OXYGEN SATURATION: 93 % | SYSTOLIC BLOOD PRESSURE: 160 MMHG | DIASTOLIC BLOOD PRESSURE: 82 MMHG

## 2021-10-12 DIAGNOSIS — E78.5 HYPERLIPIDEMIA LDL GOAL <100: ICD-10-CM

## 2021-10-12 DIAGNOSIS — I10 ESSENTIAL HYPERTENSION: ICD-10-CM

## 2021-10-12 DIAGNOSIS — I48.0 PAF (PAROXYSMAL ATRIAL FIBRILLATION) (HCC): Primary | ICD-10-CM

## 2021-10-12 PROCEDURE — 99214 OFFICE O/P EST MOD 30 MIN: CPT | Performed by: INTERNAL MEDICINE

## 2021-10-12 PROCEDURE — 93000 ELECTROCARDIOGRAM COMPLETE: CPT | Performed by: INTERNAL MEDICINE

## 2021-10-12 RX ORDER — SPIRONOLACTONE 25 MG/1
25 TABLET ORAL DAILY
Qty: 30 TABLET | Refills: 3 | Status: SHIPPED | OUTPATIENT
Start: 2021-10-12 | End: 2022-01-11 | Stop reason: SDUPTHER

## 2021-10-12 NOTE — PROGRESS NOTES
[facility] [place]  [facility] CARDIOLOGY  [address] [place] [address]  Dept: [phone]  Dept Fax: [phone]    Zenaida Bond  1940    Follow Up Office Visit Note    History of Present Illness:  Zenaida Bond is a 81 y.o. female who presents to the clinic for Follow-up. Paroxysmal atrial fibrillation- she seems doing well, we did a cardioversion last week and went back to normal, now feels with energye, no complaints, EKG sinus Hr 52,  m,scv. Will keep Sotalol 80 mg bid and also Eliquis 5mg bid    The following portions of the patient's history were reviewed and updated as appropriate: allergies, current medications, past family history, past medical history, past social history, past surgical history and problem list.    Medications:  ALPRAZolam  apixaban tablet  atorvastatin  candesartan  NIFEdipine XL  Sotalol HCl AF tablet    Subjective  Allergies   Allergen Reactions   • Cefdinir Unknown - High Severity   • Gabapentin Unknown - High Severity   • Macrobid [Nitrofurantoin] GI Intolerance        Past Medical History:   Diagnosis Date   • BMI 34.0-34.9,adult    • Essential (primary) hypertension    • Paroxysmal atrial fibrillation (HCC)    • Uterine cancer (HCC)        History reviewed. No pertinent surgical history.    Family History   Problem Relation Age of Onset   • Breast cancer Mother    • Lung cancer Father    • Heart failure Sister    • Cirrhosis Brother         Social History     Socioeconomic History   • Marital status:    Tobacco Use   • Smoking status: Never Smoker   • Smokeless tobacco: Never Used   Vaping Use   • Vaping Use: Never used   Substance and Sexual Activity   • Alcohol use: Not Currently   • Drug use: Never   • Sexual activity: Defer       Review of Systems   Constitutional: Negative.    HENT: Negative.    Respiratory: Negative.    Cardiovascular: Negative.    Endocrine: Negative.    Genitourinary: Negative.    Musculoskeletal:  "Negative.    Skin: Negative.    Allergic/Immunologic: Negative.    Neurological: Negative.    Hematological: Negative.    Psychiatric/Behavioral: Negative.    All other systems reviewed and are negative.      Cardiovascular Procedures    ECHO/MUGA:   STRESS TESTS:   CARDIAC CATH:   DEVICES:   HOLTER:   CT/MRI:   VASCULAR:   CARDIOTHORACIC:     Objective  Vitals:    10/12/21 1500   BP: 160/82   BP Location: Right arm   Patient Position: Sitting   Cuff Size: Large Adult   Pulse: 50   Resp: 15   Temp: 97.1 °F (36.2 °C)   TempSrc: Infrared   SpO2: 93%   Weight: 83.5 kg (184 lb)   Height: 152.4 cm (60\")   PainSc: 0-No pain     Body mass index is 35.94 kg/m².     Physical Exam  Constitutional:       Appearance: Healthy appearance. Not in distress.   Neck:      Vascular: No JVR. JVD normal.   Pulmonary:      Effort: Pulmonary effort is normal.      Breath sounds: Normal breath sounds. No wheezing. No rhonchi. No rales.   Chest:      Chest wall: Not tender to palpatation.   Cardiovascular:      PMI at left midclavicular line. Normal rate. Regular rhythm. Normal S1. Normal S2.      Murmurs: There is no murmur.      No gallop. No click. No rub.   Pulses:     Intact distal pulses.   Edema:     Peripheral edema absent.   Abdominal:      General: Bowel sounds are normal.      Palpations: Abdomen is soft.      Tenderness: There is no abdominal tenderness.   Musculoskeletal: Normal range of motion.         General: No tenderness. Skin:     General: Skin is warm and dry.   Neurological:      General: No focal deficit present.      Mental Status: Alert and oriented to person, place and time.          Diagnostic Data    ECG 12 Lead    Date/Time: 10/12/2021 5:02 PM  Performed by: Rajan Hernandez MD  Authorized by: Rajan Hernandez MD   Comparison: compared with previous ECG from 9/16/2021  Comparison to previous ECG: Patient converted to sinus rhythm   Rhythm: sinus bradycardia  Rate: bradycardic  BPM: 52  QRS axis: " left  Other findings: non-specific ST-T wave changes    Clinical impression: normal ECG and non-specific ECG            Assessment and Plan  Diagnoses and all orders for this visit:    PAF (paroxysmal atrial fibrillation) (HCC)- on Sotalol 80 bid, and also Eliquis, doing good after cardioversion,will keep both QT is fine     Essential hypertension- the BP is 180/.70, On Nifedipine xl 60 mg, Candesartan 32 mg and will add Aldactone 25 mg    Hyperlipidemia LDL goal <100- On Lipitor 40 mg    Other orders  -     Sotalol HCl AF 80 MG tablet; Take 1 tablet by mouth 2 (Two) Times a Day.         No follow-ups on file.    Rajan Hernandez MD  10/12/2021

## 2021-10-25 ENCOUNTER — TELEPHONE (OUTPATIENT)
Dept: CARDIOLOGY | Facility: CLINIC | Age: 81
End: 2021-10-25

## 2021-10-25 NOTE — TELEPHONE ENCOUNTER
Her back is killing her and she has been drinking more water and it isn't helping please call her     Her blood pressure has been HIGH  with taking spironolactone (ALDACTONE) 25 MG tablet      She called last week about this       please advise     bp was 168/87 at 830 am

## 2021-10-25 NOTE — TELEPHONE ENCOUNTER
She needs to come to have lab potassium and creatinine, will need to increase the Aldactone, also we need to check the BP here

## 2021-10-25 NOTE — TELEPHONE ENCOUNTER
Please advise...    Her back is killing her and she has been drinking more water and it isn't helping please call her      Her blood pressure has been HIGH  with taking spironolactone (ALDACTONE) 25 MG tablet        She called last week about this         please advise      bp was 168/87 at 830 am

## 2021-10-26 ENCOUNTER — TELEPHONE (OUTPATIENT)
Dept: CARDIOLOGY | Facility: CLINIC | Age: 81
End: 2021-10-26

## 2021-10-26 ENCOUNTER — LAB (OUTPATIENT)
Dept: CARDIOLOGY | Facility: CLINIC | Age: 81
End: 2021-10-26

## 2021-10-26 DIAGNOSIS — I48.0 PAF (PAROXYSMAL ATRIAL FIBRILLATION) (HCC): Primary | ICD-10-CM

## 2021-10-26 NOTE — TELEPHONE ENCOUNTER
Pt came in today for b/p check it is 132/77 . We did a cmp and pt is complain of back pain. Please advise?

## 2021-10-27 ENCOUNTER — TELEPHONE (OUTPATIENT)
Dept: CARDIOLOGY | Facility: CLINIC | Age: 81
End: 2021-10-27

## 2021-10-27 LAB
ALBUMIN SERPL-MCNC: 4.3 G/DL (ref 3.7–4.7)
ALBUMIN/GLOB SERPL: 1.6 {RATIO} (ref 1.2–2.2)
ALP SERPL-CCNC: 87 IU/L (ref 44–121)
ALT SERPL-CCNC: 12 IU/L (ref 0–32)
AST SERPL-CCNC: 16 IU/L (ref 0–40)
BILIRUB SERPL-MCNC: 0.6 MG/DL (ref 0–1.2)
BUN SERPL-MCNC: 23 MG/DL (ref 8–27)
BUN/CREAT SERPL: 28 (ref 12–28)
CALCIUM SERPL-MCNC: 8.7 MG/DL (ref 8.7–10.3)
CHLORIDE SERPL-SCNC: 105 MMOL/L (ref 96–106)
CO2 SERPL-SCNC: 23 MMOL/L (ref 20–29)
CREAT SERPL-MCNC: 0.83 MG/DL (ref 0.57–1)
GLOBULIN SER CALC-MCNC: 2.7 G/DL (ref 1.5–4.5)
GLUCOSE SERPL-MCNC: 109 MG/DL (ref 65–99)
POTASSIUM SERPL-SCNC: 4.3 MMOL/L (ref 3.5–5.2)
PROT SERPL-MCNC: 7 G/DL (ref 6–8.5)
SODIUM SERPL-SCNC: 142 MMOL/L (ref 134–144)

## 2021-10-27 NOTE — TELEPHONE ENCOUNTER
Tried calling pt left a vm to call back, please let her know her lab results are normal per Dr. Hernandez.

## 2021-10-27 NOTE — TELEPHONE ENCOUNTER
Pt called back and I told her, her labs were normal, she said that she has stopped the spironolactone cause she thinks it is the cause of her back pain.. says it feels much better off of it.. please advise.

## 2021-11-16 ENCOUNTER — OFFICE VISIT (OUTPATIENT)
Dept: CARDIOLOGY | Facility: CLINIC | Age: 81
End: 2021-11-16

## 2021-11-16 VITALS
WEIGHT: 186 LBS | HEART RATE: 88 BPM | DIASTOLIC BLOOD PRESSURE: 64 MMHG | BODY MASS INDEX: 36.52 KG/M2 | HEIGHT: 60 IN | TEMPERATURE: 97.1 F | OXYGEN SATURATION: 94 % | SYSTOLIC BLOOD PRESSURE: 130 MMHG | RESPIRATION RATE: 15 BRPM

## 2021-11-16 DIAGNOSIS — I48.0 PAF (PAROXYSMAL ATRIAL FIBRILLATION) (HCC): Primary | ICD-10-CM

## 2021-11-16 DIAGNOSIS — I10 ESSENTIAL HYPERTENSION: ICD-10-CM

## 2021-11-16 DIAGNOSIS — E78.5 HYPERLIPIDEMIA LDL GOAL <100: ICD-10-CM

## 2021-11-16 PROCEDURE — 99214 OFFICE O/P EST MOD 30 MIN: CPT | Performed by: INTERNAL MEDICINE

## 2021-11-16 PROCEDURE — 93000 ELECTROCARDIOGRAM COMPLETE: CPT | Performed by: INTERNAL MEDICINE

## 2021-11-16 NOTE — PROGRESS NOTES
[facility] [place]  [facility] CARDIOLOGY  [address] [place] [address]  Dept: [phone]  Dept Fax: [phone]    Zenaida Bond  1940    Follow Up Office Visit Note    History of Present Illness:  Zenaida Bond is a 81 y.o. female who presents to the clinic for Follow-up.Paroxysmal atrial fibrillation-  She seems doing well, no complaints on Sotalol 80 mg bid EKg sinus HR 59, QT is fine less 420, also on Eliquis 5mg bid    The following portions of the patient's history were reviewed and updated as appropriate: allergies, current medications, past family history, past medical history, past social history, past surgical history and problem list.    Medications:  ALPRAZolam  apixaban tablet  atorvastatin  candesartan  NIFEdipine XL  Sotalol HCl AF tablet  spironolactone    Subjective  Allergies   Allergen Reactions   • Cefdinir Unknown - High Severity   • Gabapentin Unknown - High Severity   • Macrobid [Nitrofurantoin] GI Intolerance        Past Medical History:   Diagnosis Date   • BMI 34.0-34.9,adult    • Essential (primary) hypertension    • Paroxysmal atrial fibrillation (HCC)    • Uterine cancer (HCC)        History reviewed. No pertinent surgical history.    Family History   Problem Relation Age of Onset   • Breast cancer Mother    • Lung cancer Father    • Heart failure Sister    • Cirrhosis Brother         Social History     Socioeconomic History   • Marital status:    Tobacco Use   • Smoking status: Never Smoker   • Smokeless tobacco: Never Used   Vaping Use   • Vaping Use: Never used   Substance and Sexual Activity   • Alcohol use: Not Currently   • Drug use: Never   • Sexual activity: Defer       Review of Systems   Constitutional: Negative.    HENT: Negative.    Respiratory: Negative.    Cardiovascular: Negative.    Endocrine: Negative.    Genitourinary: Negative.    Musculoskeletal: Negative.    Skin: Negative.    Allergic/Immunologic: Negative.   "  Neurological: Negative.    Hematological: Negative.    Psychiatric/Behavioral: Negative.        Cardiovascular Procedures    ECHO/MUGA:   STRESS TESTS:   CARDIAC CATH:   DEVICES:   HOLTER:   CT/MRI:   VASCULAR:   CARDIOTHORACIC:     Objective  Vitals:    11/16/21 1256   BP: 130/64   BP Location: Left arm   Patient Position: Sitting   Cuff Size: Large Adult   Pulse: 88   Resp: 15   Temp: 97.1 °F (36.2 °C)   TempSrc: Infrared   SpO2: 94%   Weight: 84.4 kg (186 lb)   Height: 152.4 cm (60\")   PainSc: 0-No pain     Body mass index is 36.33 kg/m².     Physical Exam  Constitutional:       Appearance: Healthy appearance. Not in distress.   Neck:      Vascular: No JVR. JVD normal.   Pulmonary:      Effort: Pulmonary effort is normal.      Breath sounds: Normal breath sounds. No wheezing. No rhonchi. No rales.   Chest:      Chest wall: Not tender to palpatation.   Cardiovascular:      PMI at left midclavicular line. Normal rate. Regular rhythm. Normal S1. Normal S2.      Murmurs: There is no murmur.      No gallop. No click. No rub.   Pulses:     Intact distal pulses.   Edema:     Peripheral edema absent.   Abdominal:      General: Bowel sounds are normal.      Palpations: Abdomen is soft.      Tenderness: There is no abdominal tenderness.   Musculoskeletal: Normal range of motion.         General: No tenderness. Skin:     General: Skin is warm and dry.   Neurological:      General: No focal deficit present.      Mental Status: Alert and oriented to person, place and time.          Diagnostic Data    ECG 12 Lead    Date/Time: 11/16/2021 1:20 PM  Performed by: Rajan Hernandez MD  Authorized by: Rajan Hernandez MD   Comparison: compared with previous ECG from 9/30/2021  Comparison to previous ECG: Patient is back to sinus rhythm  Rhythm: sinus rhythm and ventricular tachycardia  Rate: normal  BPM: 59  QRS axis: normal    Clinical impression: normal ECG            Assessment and Plan  Diagnoses and all orders for " this visit:    PAF (paroxysmal atrial fibrillation) (HCC)- On Sotalol 80 mg bid and also Eliquis., doing good, will keep same meds     Essential hypertension- BP is 125.80 on candesartan 32mg , Nifedipine 60 mg and also Aldactone 25 mg., lab are good.,     Hyperlipidemia LDL goal <100- On Lipitor 40 mg, tolerating well, and last lab was great         Return in about 4 months (around 3/16/2022) for Recheck.    Rajan Hernandez MD  11/16/2021

## 2022-01-11 ENCOUNTER — LAB (OUTPATIENT)
Dept: CARDIOLOGY | Facility: CLINIC | Age: 82
End: 2022-01-11

## 2022-01-11 ENCOUNTER — TELEPHONE (OUTPATIENT)
Dept: CARDIOLOGY | Facility: CLINIC | Age: 82
End: 2022-01-11

## 2022-01-11 DIAGNOSIS — I10 ESSENTIAL HYPERTENSION: Primary | ICD-10-CM

## 2022-01-11 RX ORDER — SPIRONOLACTONE 25 MG/1
25 TABLET ORAL DAILY
Qty: 90 TABLET | Refills: 0 | Status: SHIPPED | OUTPATIENT
Start: 2022-01-11 | End: 2022-04-05 | Stop reason: SDUPTHER

## 2022-01-11 NOTE — TELEPHONE ENCOUNTER
"Patient scheduled an appt this morning for bloodwork, but after talking to her, she said she wants to check her potassium and sugar because she is on a water pill and has been feeling, \"really bad\". I told her I'd leave a message to see what she should do. Let me know if I need to take her off the schedule. Please call her back, [phone]  "

## 2022-01-11 NOTE — TELEPHONE ENCOUNTER
Spoke with Ms. Bond and she is going to come in today to get blood work done.  Concerned about her potassium level since she is on a fluid pill.

## 2022-01-12 LAB
ALBUMIN SERPL-MCNC: 4.5 G/DL (ref 3.7–4.7)
ALBUMIN/GLOB SERPL: 1.6 {RATIO} (ref 1.2–2.2)
ALP SERPL-CCNC: 82 IU/L (ref 44–121)
ALT SERPL-CCNC: 13 IU/L (ref 0–32)
AST SERPL-CCNC: 18 IU/L (ref 0–40)
BILIRUB SERPL-MCNC: 0.7 MG/DL (ref 0–1.2)
BUN SERPL-MCNC: 20 MG/DL (ref 8–27)
BUN/CREAT SERPL: 24 (ref 12–28)
CALCIUM SERPL-MCNC: 9.3 MG/DL (ref 8.7–10.3)
CHLORIDE SERPL-SCNC: 105 MMOL/L (ref 96–106)
CO2 SERPL-SCNC: 23 MMOL/L (ref 20–29)
CREAT SERPL-MCNC: 0.85 MG/DL (ref 0.57–1)
GLOBULIN SER CALC-MCNC: 2.9 G/DL (ref 1.5–4.5)
GLUCOSE SERPL-MCNC: 124 MG/DL (ref 65–99)
POTASSIUM SERPL-SCNC: 4.4 MMOL/L (ref 3.5–5.2)
PROT SERPL-MCNC: 7.4 G/DL (ref 6–8.5)
SODIUM SERPL-SCNC: 143 MMOL/L (ref 134–144)

## 2022-01-13 ENCOUNTER — TELEPHONE (OUTPATIENT)
Dept: CARDIOLOGY | Facility: CLINIC | Age: 82
End: 2022-01-13

## 2022-01-13 NOTE — TELEPHONE ENCOUNTER
Potassium and kidney are good  Left message for pt please tell her Potassium and kidney are good.

## 2022-01-14 ENCOUNTER — TELEPHONE (OUTPATIENT)
Dept: CARDIOLOGY | Facility: CLINIC | Age: 82
End: 2022-01-14

## 2022-03-16 ENCOUNTER — OFFICE VISIT (OUTPATIENT)
Dept: CARDIOLOGY | Facility: CLINIC | Age: 82
End: 2022-03-16

## 2022-03-16 VITALS
RESPIRATION RATE: 12 BRPM | TEMPERATURE: 98 F | HEART RATE: 84 BPM | SYSTOLIC BLOOD PRESSURE: 124 MMHG | WEIGHT: 187 LBS | DIASTOLIC BLOOD PRESSURE: 74 MMHG | HEIGHT: 60 IN | OXYGEN SATURATION: 99 % | BODY MASS INDEX: 36.71 KG/M2

## 2022-03-16 DIAGNOSIS — E78.5 HYPERLIPIDEMIA LDL GOAL <100: ICD-10-CM

## 2022-03-16 DIAGNOSIS — I48.0 PAF (PAROXYSMAL ATRIAL FIBRILLATION): Primary | ICD-10-CM

## 2022-03-16 DIAGNOSIS — I10 ESSENTIAL HYPERTENSION: ICD-10-CM

## 2022-03-16 PROCEDURE — 93000 ELECTROCARDIOGRAM COMPLETE: CPT | Performed by: INTERNAL MEDICINE

## 2022-03-16 PROCEDURE — 99214 OFFICE O/P EST MOD 30 MIN: CPT | Performed by: INTERNAL MEDICINE

## 2022-03-16 NOTE — PROGRESS NOTES
[facility] [place]  [facility] CARDIOLOGY  [address] [place] [address]  Dept: [phone]  Dept Fax: [phone]    Zenaida Bond  1940    Follow Up Office Visit Note    History of Present Illness:  Zenaida Bond is a 81 y.o. female who presents to the clinic for Follow-up.Paroxysmal atrial fibrillation- On Sotalol 80 mg bid ,and also Eliquis 5mg bid,EKG sinus HR 55    The following portions of the patient's history were reviewed and updated as appropriate: allergies, current medications, past family history, past medical history, past social history, past surgical history and problem list.    Medications:  ALPRAZolam  apixaban tablet  atorvastatin  candesartan  Sotalol HCl AF tablet  spironolactone    Subjective  Allergies   Allergen Reactions   • Cefdinir Unknown - High Severity   • Gabapentin Unknown - High Severity   • Macrobid [Nitrofurantoin] GI Intolerance        Past Medical History:   Diagnosis Date   • BMI 34.0-34.9,adult    • Essential (primary) hypertension    • Paroxysmal atrial fibrillation (HCC)    • Uterine cancer (HCC)        History reviewed. No pertinent surgical history.    Family History   Problem Relation Age of Onset   • Breast cancer Mother    • Lung cancer Father    • Heart failure Sister    • Cirrhosis Brother         Social History     Socioeconomic History   • Marital status:    Tobacco Use   • Smoking status: Never Smoker   • Smokeless tobacco: Never Used   Vaping Use   • Vaping Use: Never used   Substance and Sexual Activity   • Alcohol use: Not Currently   • Drug use: Never   • Sexual activity: Defer       Review of Systems   Constitutional: Negative.    HENT: Negative.    Respiratory: Negative.    Cardiovascular: Negative.    Endocrine: Negative.    Genitourinary: Negative.    Musculoskeletal: Negative.    Skin: Negative.    Allergic/Immunologic: Negative.    Neurological: Negative.    Hematological: Negative.    Psychiatric/Behavioral:  "Negative.        Cardiovascular Procedures    ECHO/MUGA:   STRESS TESTS:   CARDIAC CATH:   DEVICES:   HOLTER:   CT/MRI:   VASCULAR:   CARDIOTHORACIC:     Objective  Vitals:    03/16/22 1358   BP: 124/74   BP Location: Left arm   Patient Position: Sitting   Cuff Size: Adult   Pulse: 84   Resp: 12   Temp: 98 °F (36.7 °C)   TempSrc: Infrared   SpO2: 99%   Weight: 84.8 kg (187 lb)   Height: 152.4 cm (60\")   PainSc: 0-No pain     Body mass index is 36.52 kg/m².     Physical Exam  Constitutional:       Appearance: Healthy appearance. Not in distress.   Neck:      Vascular: No JVR. JVD normal.   Pulmonary:      Effort: Pulmonary effort is normal.      Breath sounds: Normal breath sounds. No wheezing. No rhonchi. No rales.   Chest:      Chest wall: Not tender to palpatation.   Cardiovascular:      PMI at left midclavicular line. Normal rate. Regular rhythm. Normal S1. Normal S2.      Murmurs: There is no murmur.      No gallop. No click. No rub.   Pulses:     Intact distal pulses.   Edema:     Peripheral edema absent.   Abdominal:      General: Bowel sounds are normal.      Palpations: Abdomen is soft.      Tenderness: There is no abdominal tenderness.   Musculoskeletal: Normal range of motion.         General: No tenderness. Skin:     General: Skin is warm and dry.   Neurological:      General: No focal deficit present.      Mental Status: Alert and oriented to person, place and time.          Diagnostic Data    ECG 12 Lead    Date/Time: 3/16/2022 2:17 PM  Performed by: Rajan Hernandez MD  Authorized by: Rajan Hernandez MD   Comparison: compared with previous ECG from 11/16/2021  Similar to previous ECG  Rhythm: sinus rhythm  Rate: normal  QRS axis: normal  Other findings: non-specific ST-T wave changes    Clinical impression: normal ECG            Assessment and Plan  Diagnoses and all orders for this visit:    PAF (paroxysmal atrial fibrillation) (HCC)- Denies any major complaints, on Sotalol 80 mg bid, and " Eliquis 5mg bid     Essential hypertension- The BP is fine 120.80 She is only on Candesartan 32mg, and Aldactone, she is off Procardia  Lab BMP is good    Hyperlipidemia LDL goal <100- On Lipitor 40 mg, LDl is also very good         No follow-ups on file.    Rajan Hernandez MD  03/16/2022

## 2022-04-05 ENCOUNTER — TELEPHONE (OUTPATIENT)
Dept: CARDIOLOGY | Facility: CLINIC | Age: 82
End: 2022-04-05

## 2022-04-05 RX ORDER — SPIRONOLACTONE 25 MG/1
25 TABLET ORAL DAILY
Qty: 90 TABLET | Refills: 1 | Status: SHIPPED | OUTPATIENT
Start: 2022-04-05

## 2022-04-05 NOTE — TELEPHONE ENCOUNTER
spironolactone (ALDACTONE) 25 MG tablet      [place] [address] - [facility] [address] - [phone] [facility] [phone]       
patient

## 2022-04-20 ENCOUNTER — TELEPHONE (OUTPATIENT)
Dept: CARDIOLOGY | Facility: CLINIC | Age: 82
End: 2022-04-20

## 2022-04-20 NOTE — TELEPHONE ENCOUNTER
"Ms. Bond called and states she has been having chest tightness and discomfort on and off since Monday, very tired, and short of air.  She denies any swelling, \"actually my swelling in down\".  Denies radiating pain to jaw or arm, no n/v.  I advised she needs to go to the ER to be evaluated in case she is having a heart attack.  They can check your lab work and evaluate you for heart event.  Have you checked your BP or HR today? \"no\".  Can you tell if you are in Afib? \"No I can't\".  Advised patient to go to the ER.  She verbalized understanding.   "

## 2023-12-14 ENCOUNTER — TRANSCRIBE ORDERS (OUTPATIENT)
Dept: CT IMAGING | Facility: CLINIC | Age: 83
End: 2023-12-14
Payer: MEDICARE

## 2023-12-14 DIAGNOSIS — M54.50 LOW BACK PAIN, UNSPECIFIED BACK PAIN LATERALITY, UNSPECIFIED CHRONICITY, UNSPECIFIED WHETHER SCIATICA PRESENT: Primary | ICD-10-CM

## 2023-12-14 DIAGNOSIS — M25.551 RIGHT HIP PAIN: ICD-10-CM
